# Patient Record
Sex: MALE | Race: OTHER | HISPANIC OR LATINO | URBAN - METROPOLITAN AREA
[De-identification: names, ages, dates, MRNs, and addresses within clinical notes are randomized per-mention and may not be internally consistent; named-entity substitution may affect disease eponyms.]

---

## 2021-10-21 PROBLEM — Z00.00 ENCOUNTER FOR PREVENTIVE HEALTH EXAMINATION: Status: ACTIVE | Noted: 2021-10-21

## 2024-07-30 ENCOUNTER — EMERGENCY (EMERGENCY)
Facility: HOSPITAL | Age: 64
LOS: 0 days | Discharge: HOME | End: 2024-07-30
Attending: STUDENT IN AN ORGANIZED HEALTH CARE EDUCATION/TRAINING PROGRAM
Payer: SELF-PAY

## 2024-07-30 VITALS
HEART RATE: 72 BPM | OXYGEN SATURATION: 99 % | DIASTOLIC BLOOD PRESSURE: 74 MMHG | SYSTOLIC BLOOD PRESSURE: 152 MMHG | RESPIRATION RATE: 18 BRPM

## 2024-07-30 VITALS
HEART RATE: 75 BPM | HEIGHT: 64.96 IN | OXYGEN SATURATION: 96 % | RESPIRATION RATE: 18 BRPM | TEMPERATURE: 99 F | SYSTOLIC BLOOD PRESSURE: 169 MMHG | WEIGHT: 182.98 LBS | DIASTOLIC BLOOD PRESSURE: 85 MMHG

## 2024-07-30 DIAGNOSIS — R35.0 FREQUENCY OF MICTURITION: ICD-10-CM

## 2024-07-30 DIAGNOSIS — N39.0 URINARY TRACT INFECTION, SITE NOT SPECIFIED: ICD-10-CM

## 2024-07-30 DIAGNOSIS — R30.0 DYSURIA: ICD-10-CM

## 2024-07-30 DIAGNOSIS — R33.9 RETENTION OF URINE, UNSPECIFIED: ICD-10-CM

## 2024-07-30 LAB
ALBUMIN SERPL ELPH-MCNC: 4.8 G/DL — SIGNIFICANT CHANGE UP (ref 3.5–5.2)
ALP SERPL-CCNC: 80 U/L — SIGNIFICANT CHANGE UP (ref 30–115)
ALT FLD-CCNC: 13 U/L — SIGNIFICANT CHANGE UP (ref 0–41)
ANION GAP SERPL CALC-SCNC: 13 MMOL/L — SIGNIFICANT CHANGE UP (ref 7–14)
APPEARANCE UR: CLEAR — SIGNIFICANT CHANGE UP
AST SERPL-CCNC: 23 U/L — SIGNIFICANT CHANGE UP (ref 0–41)
BASOPHILS # BLD AUTO: 0.07 K/UL — SIGNIFICANT CHANGE UP (ref 0–0.2)
BASOPHILS NFR BLD AUTO: 0.8 % — SIGNIFICANT CHANGE UP (ref 0–1)
BILIRUB SERPL-MCNC: 0.8 MG/DL — SIGNIFICANT CHANGE UP (ref 0.2–1.2)
BILIRUB UR-MCNC: NEGATIVE — SIGNIFICANT CHANGE UP
BUN SERPL-MCNC: 14 MG/DL — SIGNIFICANT CHANGE UP (ref 10–20)
CALCIUM SERPL-MCNC: 9.7 MG/DL — SIGNIFICANT CHANGE UP (ref 8.4–10.5)
CHLORIDE SERPL-SCNC: 103 MMOL/L — SIGNIFICANT CHANGE UP (ref 98–110)
CO2 SERPL-SCNC: 21 MMOL/L — SIGNIFICANT CHANGE UP (ref 17–32)
COLOR SPEC: SIGNIFICANT CHANGE UP
CREAT SERPL-MCNC: 0.8 MG/DL — SIGNIFICANT CHANGE UP (ref 0.7–1.5)
DIFF PNL FLD: NEGATIVE — SIGNIFICANT CHANGE UP
EGFR: 98 ML/MIN/1.73M2 — SIGNIFICANT CHANGE UP
EOSINOPHIL # BLD AUTO: 0.16 K/UL — SIGNIFICANT CHANGE UP (ref 0–0.7)
EOSINOPHIL NFR BLD AUTO: 1.8 % — SIGNIFICANT CHANGE UP (ref 0–8)
GLUCOSE SERPL-MCNC: 110 MG/DL — HIGH (ref 70–99)
GLUCOSE UR QL: NEGATIVE MG/DL — SIGNIFICANT CHANGE UP
HCT VFR BLD CALC: 39.1 % — LOW (ref 42–52)
HGB BLD-MCNC: 13 G/DL — LOW (ref 14–18)
IMM GRANULOCYTES NFR BLD AUTO: 0.3 % — SIGNIFICANT CHANGE UP (ref 0.1–0.3)
KETONES UR-MCNC: NEGATIVE MG/DL — SIGNIFICANT CHANGE UP
LEUKOCYTE ESTERASE UR-ACNC: ABNORMAL
LYMPHOCYTES # BLD AUTO: 2.01 K/UL — SIGNIFICANT CHANGE UP (ref 1.2–3.4)
LYMPHOCYTES # BLD AUTO: 22.6 % — SIGNIFICANT CHANGE UP (ref 20.5–51.1)
MCHC RBC-ENTMCNC: 31 PG — SIGNIFICANT CHANGE UP (ref 27–31)
MCHC RBC-ENTMCNC: 33.2 G/DL — SIGNIFICANT CHANGE UP (ref 32–37)
MCV RBC AUTO: 93.3 FL — SIGNIFICANT CHANGE UP (ref 80–94)
MONOCYTES # BLD AUTO: 0.49 K/UL — SIGNIFICANT CHANGE UP (ref 0.1–0.6)
MONOCYTES NFR BLD AUTO: 5.5 % — SIGNIFICANT CHANGE UP (ref 1.7–9.3)
NEUTROPHILS # BLD AUTO: 6.12 K/UL — SIGNIFICANT CHANGE UP (ref 1.4–6.5)
NEUTROPHILS NFR BLD AUTO: 69 % — SIGNIFICANT CHANGE UP (ref 42.2–75.2)
NITRITE UR-MCNC: POSITIVE
NRBC # BLD: 0 /100 WBCS — SIGNIFICANT CHANGE UP (ref 0–0)
PH UR: 6 — SIGNIFICANT CHANGE UP (ref 5–8)
PLATELET # BLD AUTO: 332 K/UL — SIGNIFICANT CHANGE UP (ref 130–400)
PMV BLD: 9.6 FL — SIGNIFICANT CHANGE UP (ref 7.4–10.4)
POTASSIUM SERPL-MCNC: 4.5 MMOL/L — SIGNIFICANT CHANGE UP (ref 3.5–5)
POTASSIUM SERPL-SCNC: 4.5 MMOL/L — SIGNIFICANT CHANGE UP (ref 3.5–5)
PROT SERPL-MCNC: 7.7 G/DL — SIGNIFICANT CHANGE UP (ref 6–8)
PROT UR-MCNC: NEGATIVE MG/DL — SIGNIFICANT CHANGE UP
RBC # BLD: 4.19 M/UL — LOW (ref 4.7–6.1)
RBC # FLD: 14.2 % — SIGNIFICANT CHANGE UP (ref 11.5–14.5)
SODIUM SERPL-SCNC: 137 MMOL/L — SIGNIFICANT CHANGE UP (ref 135–146)
SP GR SPEC: 1.01 — SIGNIFICANT CHANGE UP (ref 1–1.03)
UROBILINOGEN FLD QL: 1 MG/DL — SIGNIFICANT CHANGE UP (ref 0.2–1)
WBC # BLD: 8.88 K/UL — SIGNIFICANT CHANGE UP (ref 4.8–10.8)
WBC # FLD AUTO: 8.88 K/UL — SIGNIFICANT CHANGE UP (ref 4.8–10.8)

## 2024-07-30 PROCEDURE — 87086 URINE CULTURE/COLONY COUNT: CPT

## 2024-07-30 PROCEDURE — 99284 EMERGENCY DEPT VISIT MOD MDM: CPT

## 2024-07-30 PROCEDURE — 80053 COMPREHEN METABOLIC PANEL: CPT

## 2024-07-30 PROCEDURE — 36000 PLACE NEEDLE IN VEIN: CPT

## 2024-07-30 PROCEDURE — 99283 EMERGENCY DEPT VISIT LOW MDM: CPT | Mod: 25

## 2024-07-30 PROCEDURE — 85025 COMPLETE CBC W/AUTO DIFF WBC: CPT

## 2024-07-30 PROCEDURE — 81001 URINALYSIS AUTO W/SCOPE: CPT

## 2024-07-30 PROCEDURE — 51702 INSERT TEMP BLADDER CATH: CPT

## 2024-07-30 PROCEDURE — 36415 COLL VENOUS BLD VENIPUNCTURE: CPT

## 2024-07-30 RX ORDER — ACETAMINOPHEN 325 MG
975 TABLET ORAL ONCE
Refills: 0 | Status: COMPLETED | OUTPATIENT
Start: 2024-07-30 | End: 2024-07-30

## 2024-07-30 RX ORDER — NITROFURANTOIN MACROCRYSTAL 100 MG
1 CAPSULE ORAL
Qty: 14 | Refills: 0
Start: 2024-07-30 | End: 2024-08-05

## 2024-07-30 NOTE — ED PROVIDER NOTE - ATTENDING CONTRIBUTION TO CARE
64 year old male with no significant PMH presenting for urinary symptoms. Patient reports dysuria, urinary frequency, and urgency x3 days, associated with suprapubic tenderness.  CONSTITUTIONAL: well developed; in no acute distress  HEAD: normocephalic; atraumatic  EYES: no conjunctival injection, no scleral icterus  ENT: no nasal discharge; airway clear.  NECK: supple; non tender.  CARD: warm and well perfused, not tachycardic  RESP: breathing comfortably on RA, speaking in full sentences w/o distress  Abdomen: Soft, suprapubic Tenderss, None Distended   EXT: moving all extremities spontaneously, normal ROM.  SKIN: warm and dry, no lesions noted  NEURO: alert, oriented, motor and sensory grossly intact, speech nonslurred  PSYCH: calm, cooperative  will do post void US and send ua and reevaluate

## 2024-07-30 NOTE — ED ADULT TRIAGE NOTE - CHIEF COMPLAINT QUOTE
"I feel pain when I urinate, I took pills to help but it made it worse, I have a lot of abd pain." Pt reports he feels like he is unable to urinate completely, c/o dribbling,

## 2024-07-30 NOTE — ED PROVIDER NOTE - CARE PROVIDER_API CALL
Nargis Lombardo  Urology  [address] [place] [id number]  Phone: [phone]  Fax: [phone]  Follow Up Time: 1-3 Days   2 = assistive person

## 2024-07-30 NOTE — ED PROVIDER NOTE - PHYSICAL EXAMINATION
VITAL SIGNS: I have reviewed nursing notes and confirm.  CONSTITUTIONAL: Well-appearing, non-toxic, in NAD  SKIN: Warm dry, normal skin turgor  HEAD: NCAT  EYES: No conjunctival injection, scleral anicteric  ENT: Moist mucous membranes, normal pharynx with no erythema or exudates  NECK: Supple; full ROM. Nontender. No cervical LAD  CARD: RRR, no murmurs, rubs or gallops  RESP: Clear to ausculation bilaterally.  No rales, rhonchi, or wheezing.  ABD: Soft, non-distended, suprapubic-tenderness, no rebound or guarding. No CVA tenderness  EXT: Full ROM, no bony tenderness, no pedal edema, no calf tenderness  NEURO: Normal motor, normal sensory.  Normal gait.  PSYCH: Cooperative, appropriate.

## 2024-07-30 NOTE — ED PROVIDER NOTE - CLINICAL SUMMARY MEDICAL DECISION MAKING FREE TEXT BOX
4 year old male with no significant PMH presenting for urinary symptoms. Patient reports dysuria, urinary frequency, and urgency x3 days, associated with suprapubic tenderness. exam showed suprapubic tenderness. post void US showed 650 cc bladder. placed Matamoros. labs showed uti, given ABX, urology consulted for close follow up they will see pt within 48 hours. explained in Setswana language by dr. Haider who is native Setswana speaker.

## 2024-07-30 NOTE — ED PROVIDER NOTE - NSFOLLOWUPINSTRUCTIONS_ED_ALL_ED_FT
Our Emergency Department Referral Coordinators will be reaching out to you in the next 24-48 hours from 9:00am to 5:00pm with a follow up appointment. Please expect a phone call from the hospital in that time frame. If you do not receive a call or if you have any questions or concerns, you can reach them at   [phone]     Retención urinaria aguda, masculino    La retención urinaria aguda es kristi afección en la que kristi persona no puede orinar o solo puede orinar un poco. Esta condición puede ocurrir repentinamente y durar poco tiempo. Si no se trata, puede volverse crónico y provocar daño renal u otras complicaciones graves.    ¿Cuales son las causas?  Esta condición puede ser causada por:  Obstrucción o estrechamiento del tubo que drena la vejiga (uretra). [place] puede deberse a kristi cirugía, problemas con los órganos cercanos o kristi lesión en la vejiga o la uretra.  Problemas con los nervios de la vejiga.  Tumores en el área de la pelvis, la vejiga o la uretra.  Ciertos medicamentos.  Infección de vejiga o del tracto urinario.  Constipación.  ¿Qué aumenta el riesgo?  Es más probable que esta afección se desarrolle en hombres mayores. A medida que los hombres envejecen, brennan próstata puede agrandarse y comenzar a presionar o apretar la vejiga o la uretra. Otras condiciones de [place]ford crónicas pueden aumentar el riesgo de retención urinaria aguda. Éstas incluyen:  Enfermedades nilda la esclerosis múltiple.  Lesiones de la médula ford.  Diabetes.  Condiciones cognitivas degenerativas, nilda delirio o demencia.  Condiciones psicológicas. Un hombre puede contener la orina debido a un traumatismo o porque no quiere ir al baño.  ¿Cuáles son los signos o síntomas?  Los síntomas de esta condición incluyen:  Problemas para orinar.  Dolor en la parte inferior del abdomen

## 2024-07-30 NOTE — ED PROVIDER NOTE - OBJECTIVE STATEMENT
Patient is a 64 year old male with no significant PMH presenting for urinary symptoms. Patient reports dysuria, urinary frequency, and urgency x3 days, associated with suprapubic tenderness. Patient denies fevers, chest pain, shortness of breath, nausea, vomiting, flank tenderness, diarrhea, constipation, or additional concerns.

## 2024-07-30 NOTE — ED ADULT NURSE REASSESSMENT NOTE - NS ED NURSE REASSESS COMMENT FT1
Pt d/c'd as per MD. Pt to go home w/ Matamoros Catheter w/ leg bad, education given as per MD Weinstein.

## 2024-07-30 NOTE — ED ADULT NURSE NOTE - NSFALLUNIVINTERV_ED_ALL_ED
Bed/Stretcher in lowest position, wheels locked, appropriate side rails in place/Call bell, personal items and telephone in reach/Instruct patient to call for assistance before getting out of bed/chair/stretcher/Non-slip footwear applied when patient is off stretcher/[place] to call system/Physically safe environment - no spills, clutter or unnecessary equipment/Purposeful proactive rounding/Room/bathroom lighting operational, light cord in reach

## 2024-07-30 NOTE — ED ADULT NURSE NOTE - OBJECTIVE STATEMENT
64 yr old male, presenting to ED c/o pelvic pain. Pt c/o urinary symptoms, reports pain on urination and difficulty urinating. Denies n/v/d/fevers/chills. pt A&Ox4, ambulatory baseline.    Interview done w/ MD Js LUNDBERG

## 2024-07-30 NOTE — ED PROVIDER NOTE - PROGRESS NOTE DETAILS
SO: Urology notified and will place patient on rapid follow-up for this week. Matamoros catheter will be placed due to urinary retention. Antibiotics sent to pharmacy.

## 2024-07-31 LAB
CULTURE RESULTS: SIGNIFICANT CHANGE UP
SPECIMEN SOURCE: SIGNIFICANT CHANGE UP

## 2024-08-01 ENCOUNTER — EMERGENCY (EMERGENCY)
Facility: HOSPITAL | Age: 64
LOS: 0 days | Discharge: HOME | End: 2024-08-02
Attending: EMERGENCY MEDICINE
Payer: SELF-PAY

## 2024-08-01 VITALS
RESPIRATION RATE: 17 BRPM | SYSTOLIC BLOOD PRESSURE: 152 MMHG | WEIGHT: 169.98 LBS | HEIGHT: 64.96 IN | HEART RATE: 97 BPM | OXYGEN SATURATION: 99 % | DIASTOLIC BLOOD PRESSURE: 85 MMHG | TEMPERATURE: 99 F

## 2024-08-01 DIAGNOSIS — R33.9 RETENTION OF URINE, UNSPECIFIED: ICD-10-CM

## 2024-08-01 PROCEDURE — 99283 EMERGENCY DEPT VISIT LOW MDM: CPT

## 2024-08-01 RX ORDER — TAMSULOSIN HYDROCHLORIDE 0.4 MG/1
1 CAPSULE ORAL
Qty: 7 | Refills: 0
Start: 2024-08-01 | End: 2024-08-07

## 2024-08-01 RX ORDER — TAMSULOSIN HYDROCHLORIDE 0.4 MG/1
0.4 CAPSULE ORAL ONCE
Refills: 0 | Status: DISCONTINUED | OUTPATIENT
Start: 2024-08-01 | End: 2024-08-02

## 2024-08-01 NOTE — ED PROVIDER NOTE - PHYSICAL EXAMINATION
VITAL SIGNS: I have reviewed nursing notes and confirm.  CONSTITUTIONAL: Well-appearing, non-toxic, in NAD  SKIN: Warm dry, normal skin turgor  HEAD: NCAT  EYES: No conjunctival injection, scleral anicteric  ENT: Moist mucous membranes, normal pharynx with no erythema or exudates  NECK: Supple; full ROM. Nontender. No cervical LAD  CARD: RRR, no murmurs, rubs or gallops  RESP: Clear to ausculation bilaterally.  No rales, rhonchi, or wheezing.  ABD: Soft, non-distended, non-tender, no rebound or guarding. No CVA tenderness  EXT: Full ROM, no bony tenderness, no pedal edema, no calf tenderness  : lucero in place; no erythema or swelling noted   NEURO: Normal motor, normal sensory. Normal gait.  PSYCH: Cooperative, appropriate.

## 2024-08-01 NOTE — ED PROVIDER NOTE - OBJECTIVE STATEMENT
Patient is a 64 year old male with no significant PMH presenting for lucero catheter complaints. Patient was seen in the ER two days prior for dysuria/urinary frequency and was found to be in urinary retention. Lucero catheter placed in the ER and patient was supposed to follow up with Urology but did not receive a call. Patient is returning to the ED to have lucero removed. Additionally patient is complaining of discomfort at the urethral opening. Patient denies fevers, discharge, hematuria, chest pain, shortness of breath, nausea, vomiting, flank pain, or additional complaints.

## 2024-08-01 NOTE — ED PROVIDER NOTE - PROGRESS NOTE DETAILS
SO: Urology notified patient was in the ER. Saw patient bedside and made an appointment for tomorrow morning. Will leave lucero in place and discharge with tamsulosin and motrin

## 2024-08-01 NOTE — ED PROVIDER NOTE - PATIENT PORTAL LINK FT
You can access the FollowMyHealth Patient Portal offered by [facility] by registering at the following website: [email or web address]. By joining [employer]’s FollowMyHealth portal, you will also be able to view your health information using other applications (apps) compatible with our system.

## 2024-08-01 NOTE — ED ADULT TRIAGE NOTE - CHIEF COMPLAINT QUOTE
pt with lucero catheter supposed to be removed yesterday  states has rash around lucero cath insertion

## 2024-08-01 NOTE — ED PROVIDER NOTE - NSFOLLOWUPINSTRUCTIONS_ED_ALL_ED_FT
You have a history of urinary retention, and have an indwelling Matamoros catheter to alleviate your bladder of urine. Please continue to take your medications as prescribed and change your catheter per instructions from your urology team. Please follow up with the urology team in the outpatient setting

## 2024-08-01 NOTE — ED PROVIDER NOTE - ATTENDING CONTRIBUTION TO CARE
I have personally seen and examined this patient.  I have fully participated in the care of this patient. I have reviewed all pertinent clinical information, including history, physical exam, plan and the resident phycisian's note and agree except as noted..

## 2024-08-01 NOTE — ED PROVIDER NOTE - NSFOLLOWUPCLINICS_GEN_ALL_ED_FT
[facility]  Urology  .  [place]   Phone: [phone]  Fax:   Follow Up Time: 1-3 Days

## 2024-08-02 VITALS
WEIGHT: 180.78 LBS | SYSTOLIC BLOOD PRESSURE: 152 MMHG | RESPIRATION RATE: 19 BRPM | HEIGHT: 65 IN | DIASTOLIC BLOOD PRESSURE: 80 MMHG | OXYGEN SATURATION: 96 % | HEART RATE: 78 BPM | TEMPERATURE: 99 F

## 2024-08-02 DIAGNOSIS — Z46.6 ENCOUNTER FOR FITTING AND ADJUSTMENT OF URINARY DEVICE: ICD-10-CM

## 2024-08-02 PROBLEM — Z78.9 OTHER SPECIFIED HEALTH STATUS: Chronic | Status: ACTIVE | Noted: 2024-07-30

## 2024-08-02 PROCEDURE — 99283 EMERGENCY DEPT VISIT LOW MDM: CPT

## 2024-08-02 RX ORDER — TAMSULOSIN HYDROCHLORIDE 0.4 MG/1
0.4 CAPSULE ORAL ONCE
Refills: 0 | Status: COMPLETED | OUTPATIENT
Start: 2024-08-02 | End: 2024-08-02

## 2024-08-02 RX ORDER — TAMSULOSIN HYDROCHLORIDE 0.4 MG/1
1 CAPSULE ORAL
Qty: 14 | Refills: 0
Start: 2024-08-02 | End: 2024-08-15

## 2024-08-02 RX ADMIN — TAMSULOSIN HYDROCHLORIDE 0.4 MILLIGRAM(S): 0.4 CAPSULE ORAL at 16:39

## 2024-08-02 NOTE — ED PROVIDER NOTE - IV ALTEPLASE EXCL REL HIDDEN
Chronic condition.  Patient reported that he used marijuana on occasion.  Strongly advised the patient to discontinue.   show

## 2024-08-02 NOTE — ED ADULT NURSE NOTE - CHIEF COMPLAINT QUOTE
Pt has a lucero catheter x 3 days, pt states he was told it is ok to have it removed by [address] but they made him an appointment for next week.

## 2024-08-02 NOTE — ED ADULT NURSE NOTE - OBJECTIVE STATEMENT
Pt has a lucero catheter x 3 days, pt states he was told it is ok to have it removed by [address] but they made him an appointment for next week.

## 2024-08-02 NOTE — ED ADULT NURSE NOTE - NSFALLUNIVINTERV_ED_ALL_ED
Bed/Stretcher in lowest position, wheels locked, appropriate side rails in place/Call bell, personal items and telephone in reach/Instruct patient to call for assistance before getting out of bed/chair/stretcher/Non-slip footwear applied when patient is off stretcher/[place] to call system/Physically safe environment - no spills, clutter or unnecessary equipment/Purposeful proactive rounding/Room/bathroom lighting operational, light cord in reach

## 2024-08-02 NOTE — ED PROVIDER NOTE - OBJECTIVE STATEMENT
64-year-old male denies any past medical history presents with Matamoros catheter complaint.  States he was given a Matamoros catheter after having urinary retention on 7/30.  States he was told to follow-up today at [address]  States he went to the urologist at [address] and was told to call her to [address] for the urology clinic, where he was told to go to [address] with the emergency department for evaluation of the catheter.  States he wants the catheter removed, as he was told he should attempt a trial of void.  States the catheter has become uncomfortable and he does not want it in place anymore.  Denies fever/chills, abdominal pain, nausea/vomiting, change in bowel/bladder habits, hematuria, dysuria/frequency/urgency, lightheadedness, dizziness, flank pain, rash, penile discharge, scrotal pain. Patient states he has a follow-up appointment with urologist this wednesday.

## 2024-08-02 NOTE — ED ADULT NURSE NOTE - NSFALLUNIVINTERV_ED_ALL_ED
Bed/Stretcher in lowest position, wheels locked, appropriate side rails in place/Call bell, personal items and telephone in reach/Instruct patient to call for assistance before getting out of bed/chair/stretcher/Non-slip footwear applied when patient is off stretcher/[place] to call system/Physically safe environment - no spills, clutter or unnecessary equipment/Purposeful proactive rounding/Room/bathroom lighting operational, light cord in reach

## 2024-08-02 NOTE — ED PROVIDER NOTE - PROGRESS NOTE DETAILS
AY: Patient states he wishes to go home despite having not had urination here.  Explained to patient that without trial of void, there is no certainty he will be able to urinate after being discharged from the hospital.  Patient states he feels he has not had enough water today and wishes to go home and try to drink water and continue to attempt to void.  Explained risks of going home without having attempted urination, and patient states he is aware that if he has any pain to his abdomen or suprapubic/ region that he has to return to the emergency department immediately.  Patient given opportunity to ask questions and endorsed understanding.  Patient endorsed that he understands he has to take Flomax once per day every day in order to aid with resolving potential urinary retention.

## 2024-08-02 NOTE — ED PROVIDER NOTE - NSPTACCESSSVCSAPPTDETAILS_ED_ALL_ED_FT
[place] speaking patient needs follow-up with urologist for urinary retention within 1 week or less

## 2024-08-02 NOTE — ED PROVIDER NOTE - PATIENT PORTAL LINK FT
You can access the FollowMyHealth Patient Portal offered by [facility] by registering at the following website: [email or web address]. By joining [employer]’s FollowMyHealth portal, you will also be able to view your health information using other applications (apps) compatible with our system.

## 2024-08-02 NOTE — ED ADULT TRIAGE NOTE - CHIEF COMPLAINT QUOTE
Pt has a lucero catheter x 3 days, pt states he was told it is ok to have it removed by [address] but they made him an appointment for next week.

## 2024-08-02 NOTE — ED PROVIDER NOTE - NSFOLLOWUPINSTRUCTIONS_ED_ALL_ED_FT
Nuestros coordinadores de referencias del departamento de emergencias se comunicarán con usted en las próximas 24 a  48 horas de 9:00 a. m. a 5:00 p. m. (de lunes a viernes) con kristi mo de seguimiento. Espere kristi llamada telefónica del hospital en tosin período de tiempo. Si no recibe kristi llamada o si tiene alguna pregunta o inquietud, puede comunicarse con rimaos al [phone].      Retención urinaria aguda en los hombres  Acute Urinary Retention, Male    La retención urinaria aguda es kristi afección por la cual la persona no puede orinar o solo puede orinar poco. Esta afección puede ocurrir de repente y durar poco tiempo. Si no se trata, puede tornarse duradera (crónica) y ocasionar daño renal u otras complicaciones graves.    ¿Cuáles son las causas?  Esta afección puede ser causada por lo siguiente:  Obstrucción o estrechamiento del tubo que drena la vejiga (uretra). [place] puede sandy sido provocado por kristi cirugía, problemas en órganos cercanos o lesión en la vejiga o la uretra.  Problemas con los nervios de la vejiga.  Tumores en el área de la pelvis, la vejiga o la uretra.  Ciertos medicamentos.  Infección de vejiga o de las vías urinarias.  Estreñimiento.  ¿Qué incrementa el riesgo?  Es más probable que esta afección se desarrolle en hombres mayores. A medida que los hombres envejecen, la próstata se puede agrandar y comenzar a presionar o apretar la vejiga o la uretra. Otras afecciones crónicas pueden aumentar el riesgo de retención urinaria aguda. Estas incluyen:  Enfermedades, nilda la esclerosis múltiple.  Lesiones en la médula ford.  Diabetes.  Trastornos cognitivos degenerativos, nilda delirios o demencia.  Afecciones psicológicas. Un hombre puede aguantar la orina por un traumatismo o porque no quiere usar el baño.  ¿Cuáles son los signos o síntomas?  Los síntomas de esta afección incluyen:  Dificultad para orinar.  Dolor en la parte baja del abdomen.  ¿Cómo se diagnostica?  Esta afección se diagnostica en función de un examen físico y de los antecedentes médicos. También pueden hacerle otras pruebas, incluidas las siguientes:  Kristi ecografía de la vejiga, del riñón o de ambos.  Análisis de luisito.  Análisis de orina.  Es posible que se necesiten exámenes adicionales, tales nilda exploración por tomografía computarizada (TC) resonancia magnética (RM), y pruebas funcionales del riñón o de la vejiga.  ¿Cómo se trata?  El tratamiento de esta afección puede incluir lo siguiente:  Medicamentos.  Colocar un tubo landrum estéril (catéter) en la vejiga para drenar la orina del cuerpo. [place] recibe el nombre de catéter urinario permanente. Luego de que se inserta, el catéter se mantiene en brennan lugar con un pequeño balón que se llena con agua estéril. La orina se drena desde el catéter hasta la bolsa de recolección fuera del cuerpo.  Terapia conductual.  Tratamiento para otras afecciones.  De ser necesario, puede recibir tratamiento en el hospital para problemas de la función renal o para tratar otras complicaciones.    Siga estas instrucciones en brennan casa:  Medicamentos    Use los medicamentos de venta mayte y los recetados solamente nilda se lo haya indicado el médico. Evite determinados medicamentos, tales nilda descongestivos, antihistamínicos y algunos medicamentos recetados. No tome medicamentos a menos que lo haya autorizado el médico.  Si le recetaron un antibiótico, tómelo nilda se lo haya indicado el médico. No deje de usar el antibiótico aunque comience a sentirse mejor.  Indicaciones generales    No consuma ningún producto que contenga nicotina o tabaco. Estos productos incluyen cigarrillos, tabaco para mascar y aparatos de vapeo, nilda los cigarrillos electrónicos. Si necesita ayuda para dejar de consumir estos productos, consulte al médico.  Fabiola suficiente líquido nilda para mantener la orina de color amarillo pálido.  Si le calvin colocado un catéter urinario permanente, siga las instrucciones del médico.  Controle si hay cambios en los síntomas. Informe al médico si nota algún cambio.  Si así se le indica, controle brennan presión arterial en casa. Informe cualquier cambio en brennan bradford nilda se lo haya indicado el médico.  Cumpla con todas las visitas de seguimiento. [place] es importante.  Comuníquese con un médico si:  Tiene contracciones de vejiga incómodas que no puede controlar (espasmos).  Tiene pérdida de orina con los espasmos.  Solicite ayuda de inmediato si:  Siente escalofríos o tiene fiebre.  Observa luisito en la orina.  Tiene un catéter y ocurre lo siguiente:  El catéter diane de drenar orina.  El catéter se sale del lugar.  Resumen  La retención urinaria aguda es kristi afección por la cual la persona no puede orinar o solo puede orinar poco. Si no se trata, esta afección puede resultar en daño renal u otras complicaciones graves.  Kristi próstata agrandada puede causar esta afección. A medida que los hombres envejecen, la próstata se puede agrandar y comenzar a presionar o apretar la vejiga o la uretra.  El tratamiento de esta afección puede incluir medicamentos y el uso de un catéter urinario permanente.  Controle si hay cambios en los síntomas. Informe al médico si nota algún cambio.

## 2024-08-02 NOTE — ED PROVIDER NOTE - CLINICAL SUMMARY MEDICAL DECISION MAKING FREE TEXT BOX
Agree with above history exam.  Patient here with indwelling Matamoros requesting it to be removed.  Here patient had Matamoros moved did not want to wait for trial of void.  Patient is on Flomax.  Outpatient urology  follow-up advised return if unable to void at home.

## 2024-08-02 NOTE — ED PROVIDER NOTE - PHYSICAL EXAMINATION
Vital Signs: I have reviewed the initial vital signs.  Constitutional: appears stated age, no acute distress  Eyes: Sclera clear, EOMI.  Cardiovascular: S1 and S2, regular rate, regular rhythm, well-perfused extremities, radial pulses equal and 2+, pedal pulses 2+ and equal  Respiratory: unlabored respiratory effort, clear to auscultation bilaterally no wheezing, rales, or rhonchi  Gastrointestinal:  abdomen soft, non-tender, no CVA tenderness, lucero catheter in place without surrounding irritation.  Musculoskeletal: supple neck, no lower extremity edema  Integumentary: warm, dry, no rash  Neurologic: awake, alert, oriented x3, extremities’ motor and sensory functions grossly intact

## 2024-08-07 ENCOUNTER — OUTPATIENT (OUTPATIENT)
Dept: PREADMISSION | Facility: HOSPITAL | Age: 64
LOS: 1 days | Discharge: HOME | End: 2024-08-07
Payer: COMMERCIAL

## 2024-08-07 ENCOUNTER — APPOINTMENT (OUTPATIENT)
Age: 64
End: 2024-08-07

## 2024-08-07 DIAGNOSIS — Z00.00 ENCOUNTER FOR GENERAL ADULT MEDICAL EXAMINATION WITHOUT ABNORMAL FINDINGS: ICD-10-CM

## 2024-08-07 PROBLEM — N40.1 BENIGN PROSTATIC HYPERPLASIA WITH URINARY OBSTRUCTION: Status: ACTIVE | Noted: 2024-08-07

## 2024-08-07 PROBLEM — Z12.5 SCREENING PSA (PROSTATE SPECIFIC ANTIGEN): Status: ACTIVE | Noted: 2024-08-07

## 2024-08-07 PROBLEM — R30.0 DYSURIA: Status: ACTIVE | Noted: 2024-08-07

## 2024-08-07 PROCEDURE — G2211 COMPLEX E/M VISIT ADD ON: CPT

## 2024-08-07 PROCEDURE — 99204 OFFICE O/P NEW MOD 45 MIN: CPT

## 2024-08-07 NOTE — HISTORY OF PRESENT ILLNESS
[FreeTextEntry1] : 64M w hx of BPH/Obstructive LUTS, had a fole catheter placed over  a week ago. He has not tolerated the catheter well and visited the ED multiple times for its removal/TOV.  Patient reports having his Matamoros catheter removed last Friday without any issues.  He has since been voiding without any obstructive issues.  He has been taking tamsulosin daily and ibuprofen for vague lower abdominal pain.  He does complain however of mild dysuria. Denies fever/chills, abdominal pain, nausea/vomiting, change in bowel/bladder habits, hematuria, Dysuria/frequency/urgency, lightheadedness, dizziness, flank pain, rash, penile  Labs 7/2024 UCx normal urogenital tamar UA neg for microhematuria Cr 0.8, GFR 98

## 2024-08-07 NOTE — ASSESSMENT
[FreeTextEntry1] : #BPH/LUTS/PSA screening - obstructive - hx of lucero passed TOV on Friday in ED, no PVR recorded - Bladder US to assess prostate size and PVR - start flomax daily - PSA in 1 month given his recent  tract malipulation with his lucero catheter - UA/UCx - will treat empirically for UTI w Bactrim x1week given pt persistant irritative voiding symptoms Discussed behavioral modifications: 1) Double voiding 2) Avoid caffeine, alcohol, spicy foods, tea, soda, artificial sweeteners, drinking liquids 3-4 hrs before bedtime 3) Perform leg elevation prior to sleeping 4) Avoid constipation by increasing dietary fiber intake with daily supplements to achieve 25 grams of fiber per day.  Increase daily hydration to 64oz of non-caffeinated liquids.  Use stool softeners and/or laxatives as needed if still unable to have daily soft BM without straining.   Also discussed medications adverse effects: 1) Alpha blocker - SE's include light headedness, orthostasis, reduction in volume of ejaculation, floppy iris syndrome   If medical management fails, pt is in chronic urinary retention, recurrent UTIs or urolithiasis, or the patient has BRIDGET caused by obstructive voiding symptoms, he may consider surgery. There are different surgical options including transurethral resection of the prostate, Greenlight laser prostatectomy, Minimally invasive surgical therapies like Urolift and Rezum, Holep and Robotic simple prostatectomy. We discussed the risks including bleeding, infection, damage to the urethra, bladder and ureteral orifices, scar tissue, leaking (either due to injuring the sphincter or lowering bladder outlet obstruction with concomitant detrusor overactivity) and retrograde ejaculation. The patient understands that some of these complications are reversible while some are not and may require additional procedures.

## 2024-08-07 NOTE — PLAN

## 2024-08-07 NOTE — PHYSICAL EXAM
[Abdomen Soft] : soft [Abdomen Tenderness] : non-tender [Costovertebral Angle Tenderness] : no ~M costovertebral angle tenderness [de-identified] : bilaterally

## 2024-08-22 DIAGNOSIS — N40.1 BENIGN PROSTATIC HYPERPLASIA WITH LOWER URINARY TRACT SYMPTOMS: ICD-10-CM

## 2024-08-22 DIAGNOSIS — R30.0 DYSURIA: ICD-10-CM

## 2024-08-22 DIAGNOSIS — Z12.5 ENCOUNTER FOR SCREENING FOR MALIGNANT NEOPLASM OF PROSTATE: ICD-10-CM

## 2024-10-01 ENCOUNTER — NON-APPOINTMENT (OUTPATIENT)
Age: 64
End: 2024-10-01

## 2024-10-02 ENCOUNTER — APPOINTMENT (OUTPATIENT)
Age: 64
End: 2024-10-02

## 2024-11-05 ENCOUNTER — EMERGENCY (EMERGENCY)
Facility: HOSPITAL | Age: 64
LOS: 0 days | Discharge: LEFT BEFORE DISCHARGE | End: 2024-11-05
Payer: SELF-PAY

## 2024-11-05 VITALS
RESPIRATION RATE: 16 BRPM | TEMPERATURE: 99 F | HEART RATE: 70 BPM | OXYGEN SATURATION: 98 % | SYSTOLIC BLOOD PRESSURE: 136 MMHG | DIASTOLIC BLOOD PRESSURE: 83 MMHG

## 2024-11-05 PROCEDURE — L9991: CPT

## 2024-11-06 DIAGNOSIS — Z53.21 PROCEDURE AND TREATMENT NOT CARRIED OUT DUE TO PATIENT LEAVING PRIOR TO BEING SEEN BY HEALTH CARE PROVIDER: ICD-10-CM

## 2024-11-06 DIAGNOSIS — Z76.0 ENCOUNTER FOR ISSUE OF REPEAT PRESCRIPTION: ICD-10-CM

## 2024-11-15 ENCOUNTER — OUTPATIENT (OUTPATIENT)
Dept: OUTPATIENT SERVICES | Facility: HOSPITAL | Age: 64
LOS: 1 days | End: 2024-11-15
Payer: COMMERCIAL

## 2024-11-15 DIAGNOSIS — N40.1 BENIGN PROSTATIC HYPERPLASIA WITH LOWER URINARY TRACT SYMPTOMS: ICD-10-CM

## 2024-11-15 PROCEDURE — 76857 US EXAM PELVIC LIMITED: CPT | Mod: 26

## 2024-11-15 PROCEDURE — 76857 US EXAM PELVIC LIMITED: CPT

## 2024-11-16 DIAGNOSIS — N40.1 BENIGN PROSTATIC HYPERPLASIA WITH LOWER URINARY TRACT SYMPTOMS: ICD-10-CM

## 2024-12-17 ENCOUNTER — EMERGENCY (EMERGENCY)
Facility: HOSPITAL | Age: 64
LOS: 0 days | Discharge: HOME | End: 2024-12-17
Attending: EMERGENCY MEDICINE
Payer: MEDICAID

## 2024-12-17 VITALS
RESPIRATION RATE: 18 BRPM | OXYGEN SATURATION: 99 % | SYSTOLIC BLOOD PRESSURE: 150 MMHG | DIASTOLIC BLOOD PRESSURE: 92 MMHG | HEART RATE: 71 BPM | WEIGHT: 169.32 LBS | TEMPERATURE: 98 F

## 2024-12-17 DIAGNOSIS — N40.0 BENIGN PROSTATIC HYPERPLASIA WITHOUT LOWER URINARY TRACT SYMPTOMS: ICD-10-CM

## 2024-12-17 DIAGNOSIS — H92.01 OTALGIA, RIGHT EAR: ICD-10-CM

## 2024-12-17 DIAGNOSIS — H60.91 UNSPECIFIED OTITIS EXTERNA, RIGHT EAR: ICD-10-CM

## 2024-12-17 PROCEDURE — 99283 EMERGENCY DEPT VISIT LOW MDM: CPT

## 2024-12-17 PROCEDURE — 82962 GLUCOSE BLOOD TEST: CPT

## 2024-12-17 PROCEDURE — 99284 EMERGENCY DEPT VISIT MOD MDM: CPT

## 2024-12-17 RX ADMIN — Medication 4 DROP(S): at 12:16

## 2024-12-17 NOTE — ED PROVIDER NOTE - OBJECTIVE STATEMENT
Case of a 65y/o M, Mongolian speaking, PMHx BPH, presenting with R ear pain for past 9 days. Pt reported using OTC terbinafine ointment and homeopathic ear drops which have not helped. Denies other facial pain, or constitutional symptoms.  Interpretor ID#:[id number]

## 2024-12-17 NOTE — ED PROVIDER NOTE - PHYSICAL EXAMINATION
VITALS:   T(C): 36.9 (12-17-24 @ 10:47), Max: 36.9 (12-17-24 @ 10:47)  HR: 71 (12-17-24 @ 10:47) (71 - 71)  BP: 150/92 (12-17-24 @ 10:47) (150/92 - 150/92)  RR: 18 (12-17-24 @ 10:47) (18 - 18)  SpO2: 99% (12-17-24 @ 10:47) (99% - 99%)    GENERAL: NAD, lying in bed comfortably  HENT: Right ear - erythematous external ear and EAC with pus. Left ear - no signs of inflammation, tympanic membrane not visualized d/t ear wax obstructing view.  EXTREMITIES: No clubbing, cyanosis, or edema  NERVOUS SYSTEM:  A&Ox3  SKIN: No rashes or lesions

## 2024-12-17 NOTE — ED PROVIDER NOTE - NSFOLLOWUPINSTRUCTIONS_ED_ALL_ED_FT
Otitis externa      LO QUE NECESITA SABER:  La otitis externa, u oído de nadador, es kristi infección en el conducto auditivo externo. Mica conducto va desde la parte exterior del oído hasta el tímpano. Anatomía del oído    INSTRUCCIONES PARA EL ASIYA:  Regrese al departamento de emergencias si:  Tiene un dolor de oído intenso.  De repente no puede oír nada.  Tiene kristi nueva hinchazón en la krista, detrás de las orejas o en el negro.  De repente no puede  parte de la krista.  De repente siente la krista entumecida.  Comuníquese con brennan proveedor de atención médica si:  Tiene fiebre.  Jose Armando signos y síntomas no mejoran después de 2 andrea de tratamiento.  Jose Armando signos y síntomas desaparecen por un tiempo, vu luego regresan.  Tiene preguntas o inquietudes sobre brennan afección o atención médica.      Medicamentos:  Los MELANIE, nilda el ibuprofeno, ayudan a disminuir la hinchazón, el dolor y la fiebre. Mica medicamento está disponible con o sin receta médica. Los MELANIE pueden causar sangrado estomacal o problemas renales en ciertas personas. Si vivien medicamentos anticoagulantes, siempre pregunte si los MELANIE son seguros para usted. Emma siempre la etiqueta del medicamento y siga las instrucciones. No le dé estos medicamentos a niños menores de 6 meses sin la autorización del médico de brennan hijo.  El acetaminofeno disminuye el dolor y la fiebre. Está disponible sin receta médica. Pregunte cuánto reji y con qué frecuencia. Siga las instrucciones. El acetaminofeno puede causar daño hepático si no se vivien correctamente.  Abeytas gotas para los oídos llamadas "Corticosporin Otic". Coloque 4 gotas en brennan oído cada 4 horas. Continúe usando las gotas hasta que se acabe el frasco. El antibiótico ayuda a tratar kristi infección bacteriana.  Abeytas brennan medicamento según las indicaciones. Comuníquese con brennan médico si leidy que brennan medicamento no le está ayudando o si tiene efectos secundarios. Infórmele si es alérgico a algún medicamento. Lleve kristi lista de los medicamentos, las vitaminas y las hierbas que vivien. Incluya las cantidades, cuándo y por qué los vivien. Lleve la lista o los frascos de pastillas a las visitas de seguimiento. Lleve consigo la lista de medicamentos en giancarlo de kristi emergencia.  Realice un seguimiento con brennan proveedor de atención médica según las indicaciones: Escriba jose armando preguntas para recordar hacerlas lashonda jose armando visitas.  Cómo usar las gotas para los oídos:  Recuéstese de lado con el oído infectado hacia arriba.  Coloque con cuidado la cantidad correcta de gotas en el oído. Si es posible, pida ayuda a otra persona.  Mueva suavemente la parte externa de la oreja hacia adelante y hacia atrás para ayudar a que el medicamento llegue al canal auditivo.  Permanezca acostado en la misma posición (con la oreja hacia arriba) lashonda 3 a 5 minutos.  Cómo prevenir la otitis externa:  No se coloque hisopos de algodón ni objetos extraños en los oídos.  Envuelva un paño limpio y húmedo alrededor de brennan dedo y úselo para limpiar el oído externo y eliminar la cera sobrante.  Use tapones para los oídos cuando nade. Seque completamente los oídos externos después de nadar o bañarse.      © Copyright [employer] 2019 Todas las ilustraciones e imágenes incluidas en [employer] son propiedad protegida por derechos de autor de [employer] o [employer].

## 2024-12-17 NOTE — ED PROVIDER NOTE - PATIENT PORTAL LINK FT
You can access the FollowMyHealth Patient Portal offered by [facility] by registering at the following website: [email or web address]. By joining [employer]’s FollowMyHealth portal, you will also be able to view your health information using other applications (apps) compatible with our system.

## 2024-12-17 NOTE — ED PROVIDER NOTE - CLINICAL SUMMARY MEDICAL DECISION MAKING FREE TEXT BOX
VSS.  No distress.  FSBG normal.  Otitis externa, no need for wick.  Topical antibiotic/steroids.  DC home.  Strict return instructions discussed.

## 2025-01-06 ENCOUNTER — APPOINTMENT (OUTPATIENT)
Age: 65
End: 2025-01-06
Payer: COMMERCIAL

## 2025-01-06 ENCOUNTER — APPOINTMENT (OUTPATIENT)
Dept: UROLOGY | Facility: CLINIC | Age: 65
End: 2025-01-06

## 2025-01-06 ENCOUNTER — OUTPATIENT (OUTPATIENT)
Dept: OUTPATIENT SERVICES | Facility: HOSPITAL | Age: 65
LOS: 1 days | End: 2025-01-06
Payer: COMMERCIAL

## 2025-01-06 VITALS
DIASTOLIC BLOOD PRESSURE: 85 MMHG | WEIGHT: 174 LBS | OXYGEN SATURATION: 95 % | BODY MASS INDEX: 28.99 KG/M2 | SYSTOLIC BLOOD PRESSURE: 140 MMHG | HEART RATE: 63 BPM | HEIGHT: 64.96 IN | TEMPERATURE: 97.9 F

## 2025-01-06 DIAGNOSIS — R39.15 URGENCY OF URINATION: ICD-10-CM

## 2025-01-06 DIAGNOSIS — N40.1 BENIGN PROSTATIC HYPERPLASIA WITH LOWER URINARY TRACT SYMPMS: ICD-10-CM

## 2025-01-06 DIAGNOSIS — N13.8 BENIGN PROSTATIC HYPERPLASIA WITH LOWER URINARY TRACT SYMPMS: ICD-10-CM

## 2025-01-06 DIAGNOSIS — R39.9 UNSPECIFIED SYMPTOMS AND SIGNS INVOLVING THE GENITOURINARY SYSTEM: ICD-10-CM

## 2025-01-06 DIAGNOSIS — Z00.00 ENCOUNTER FOR GENERAL ADULT MEDICAL EXAMINATION WITHOUT ABNORMAL FINDINGS: ICD-10-CM

## 2025-01-06 PROCEDURE — 99214 OFFICE O/P EST MOD 30 MIN: CPT

## 2025-01-06 PROCEDURE — G2211 COMPLEX E/M VISIT ADD ON: CPT

## 2025-01-06 RX ORDER — TAMSULOSIN HYDROCHLORIDE 0.4 MG/1
0.4 CAPSULE ORAL
Qty: 180 | Refills: 1 | Status: ACTIVE | COMMUNITY
Start: 2025-01-06 | End: 1900-01-01

## 2025-01-09 ENCOUNTER — EMERGENCY (EMERGENCY)
Facility: HOSPITAL | Age: 65
LOS: 0 days | Discharge: HOME | End: 2025-01-09
Attending: STUDENT IN AN ORGANIZED HEALTH CARE EDUCATION/TRAINING PROGRAM
Payer: MEDICAID

## 2025-01-09 VITALS
OXYGEN SATURATION: 98 % | HEART RATE: 88 BPM | RESPIRATION RATE: 18 BRPM | WEIGHT: 175.93 LBS | SYSTOLIC BLOOD PRESSURE: 120 MMHG | TEMPERATURE: 98 F | DIASTOLIC BLOOD PRESSURE: 76 MMHG

## 2025-01-09 DIAGNOSIS — K08.89 OTHER SPECIFIED DISORDERS OF TEETH AND SUPPORTING STRUCTURES: ICD-10-CM

## 2025-01-09 DIAGNOSIS — R22.0 LOCALIZED SWELLING, MASS AND LUMP, HEAD: ICD-10-CM

## 2025-01-09 DIAGNOSIS — K02.9 DENTAL CARIES, UNSPECIFIED: ICD-10-CM

## 2025-01-09 DIAGNOSIS — K04.7 PERIAPICAL ABSCESS WITHOUT SINUS: ICD-10-CM

## 2025-01-09 PROCEDURE — D7140: CPT

## 2025-01-09 PROCEDURE — 99284 EMERGENCY DEPT VISIT MOD MDM: CPT

## 2025-01-09 PROCEDURE — D0230: CPT

## 2025-01-09 PROCEDURE — D0330: CPT

## 2025-01-09 PROCEDURE — T1013: CPT

## 2025-01-09 PROCEDURE — 99284 EMERGENCY DEPT VISIT MOD MDM: CPT | Mod: 25

## 2025-01-09 PROCEDURE — D0220: CPT

## 2025-01-09 PROCEDURE — D0140: CPT

## 2025-01-09 RX ORDER — IBUPROFEN 200 MG
1 TABLET ORAL
Qty: 21 | Refills: 0
Start: 2025-01-09 | End: 2025-01-15

## 2025-01-09 RX ORDER — AMOXICILLIN 500 MG
1 CAPSULE ORAL
Qty: 21 | Refills: 0
Start: 2025-01-09 | End: 2025-01-15

## 2025-01-09 NOTE — ED PROVIDER NOTE - PHYSICAL EXAMINATION
Physical Exam    Vital Signs: I have reviewed the initial vital signs.  Constitutional: well-nourished, appears stated age, no acute distress  Dental: + pain, swelling and discharge to right upper gums. +laxity to tooth #10  Neuro: AOx3, No focal deficits noted

## 2025-01-09 NOTE — ED PROVIDER NOTE - CLINICAL SUMMARY MEDICAL DECISION MAKING FREE TEXT BOX
No submandibular or floor or mouth swelling, voice change, odynophagia or drooling, fever, chills, or inhaled tooth fragment.   Exam shows caries, no inflamed alveolus, + abscess on right upper molar  A/P: Likely caries and dental infection. Will transfer to dental for abscess drainage No sign of deep infection, airway compromise, or spread into surrounding structures.

## 2025-01-09 NOTE — ED PROVIDER NOTE - OBJECTIVE STATEMENT
64-year-old male complaining of dental pain.  Patient states that his front tooth has been loose and hurting him for the past 2 years.  Patient states he now has pain over his right  upper gums.  No fevers.

## 2025-01-09 NOTE — CONSULT NOTE ADULT - ASSESSMENT
Patient is a 64y old male resents with a chief complaint of "upper right swelling".    HPI: Patient reports that the swelling started several days ago and the pain has persisted.    PAST MEDICAL & SURGICAL HISTORY:  No pertinent past medical history  No significant past surgical history    Allergies  No Known Allergies    FAMILY HISTORY:  No pertinent family history in first degree relatives    Vital Signs Last 24 Hrs  T(C): 36.6 (09 Jan 2025 11:31), Max: 36.6 (09 Jan 2025 11:31)  T(F): 97.8 (09 Jan 2025 11:31), Max: 97.8 (09 Jan 2025 11:31)  HR: 88 (09 Jan 2025 11:31) (88 - 88)  BP: 120/76 (09 Jan 2025 11:31) (120/76 - 120/76)  BP(mean): --  RR: 18 (09 Jan 2025 11:31) (18 - 18)  SpO2: 98% (09 Jan 2025 11:31) (98% - 98%)    Parameters below as of 09 Jan 2025 11:31  Patient On (Oxygen Delivery Method): room air    EOE:  TMJ ( -) clicks                     ( -) pops                     ( -) crepitus           Mandible PAYAM within normal limits           Facial bones and MOM <<grossly intact>>             ( -) trismus             ( -) lymphadenopathy             ( -) swelling             ( -) asymmetry             ( -) palpation             ( -) dyspnea             ( -) dysphagia             ( -) loss of consciousness    IOE:  <<permanent>> dentition: <<grossly intact, multiple carious teeth>>           tongue/FOM <<No pathology noted>>           labial/buccal mucosa <<No pathology noted>>           ( +)percussion           ( +) palpation           ( -)swelling            ( +) abscess           ( -) sinus tract    *ASSESSMENT: Clinical exam reveals dental abscess on upper right side, patient expresses pain on palpation during clinical exam, purulence noted.     *PLAN: Patient was transported over to the dental clinic at [facility] for radiographic evaluation. Radiographs reveal extensive bone loss noted around tooth #3. Informed patient that tooth #3 requires extraction in order to treat the infection. Patient agrees to go forward with the extraction.     PROCEDURE: EXT #3  Treatment risks and benefits explained as per OS sheet dated 07/13/2000. Consent obtained and side site completed.  Anesthesia: 3 carpules 3% mepivacaine via local infiltration.   Treatment: Tooth #3 was extracted using elevators and forceps. Thoroughly curettaged and irrigated using saline. Review post-op instructions with patient. Patient understood.    RECOMMENDATIONS:  1) Amoxicillin 500mg, Ibuprofen 600mg  2) F/U with outpatient dentist for comprehensive dental care.     Resident Name: Vilma Barboza DDS

## 2025-01-09 NOTE — ED PROVIDER NOTE - NSFOLLOWUPCLINICS_GEN_ALL_ED_FT
[facility]  Dental  [address] [place] [id number]  Phone: [phone]  Fax:   Follow Up Time: 1-3 Days

## 2025-01-09 NOTE — ED PROVIDER NOTE - PATIENT PORTAL LINK FT
You can access the FollowMyHealth Patient Portal offered by [facility] by registering at the following website: [email or web address]. By joining [employer]’s FollowMyHealth portal, you will also be able to view your health information using other applications (apps) compatible with our system.

## 2025-01-10 DIAGNOSIS — N40.1 BENIGN PROSTATIC HYPERPLASIA WITH LOWER URINARY TRACT SYMPTOMS: ICD-10-CM

## 2025-01-10 DIAGNOSIS — R39.15 URGENCY OF URINATION: ICD-10-CM

## 2025-01-10 NOTE — CONSULT NOTE ADULT - ASSESSMENT
Patient is a 64y old male resents with a chief complaint of "upper right swelling".    [employer] Used: ID [id number]    HPI: Patient reports that the swelling started several days ago and the pain has persisted.    PAST MEDICAL & SURGICAL HISTORY:  No pertinent past medical history  No significant past surgical history    Allergies  No Known Allergies    FAMILY HISTORY:  No pertinent family history in first degree relatives    Vital Signs Last 24 Hrs  T(C): 36.6 (09 Jan 2025 11:31), Max: 36.6 (09 Jan 2025 11:31)  T(F): 97.8 (09 Jan 2025 11:31), Max: 97.8 (09 Jan 2025 11:31)  HR: 88 (09 Jan 2025 11:31) (88 - 88)  BP: 120/76 (09 Jan 2025 11:31) (120/76 - 120/76)  BP(mean): --  RR: 18 (09 Jan 2025 11:31) (18 - 18)  SpO2: 98% (09 Jan 2025 11:31) (98% - 98%)    Parameters below as of 09 Jan 2025 11:31  Patient On (Oxygen Delivery Method): room air    EOE:  TMJ ( -) clicks                     ( -) pops                     ( -) crepitus           Mandible PAYAM within normal limits           Facial bones and MOM <<grossly intact>>             ( -) trismus             ( -) lymphadenopathy             ( -) swelling             ( -) asymmetry             ( -) palpation             ( -) dyspnea             ( -) dysphagia             ( -) loss of consciousness    IOE:  <<permanent>> dentition: <<grossly intact, multiple carious teeth>>           tongue/FOM <<No pathology noted>>           labial/buccal mucosa <<No pathology noted>>           ( +)percussion           ( +) palpation           ( -)swelling            ( +) abscess           ( -) sinus tract    *ASSESSMENT: Clinical exam reveals dental abscess on upper right side, patient expresses pain on palpation during clinical exam, purulence noted.     *PLAN: Patient was transported over to the dental clinic at [facility] for radiographic evaluation. Radiographs reveal extensive bone loss noted around tooth #3. Informed patient that tooth #3 requires extraction in order to treat the infection. Patient agrees to go forward with the extraction.     PROCEDURE: EXT #3  Treatment risks and benefits explained as per OS sheet dated 07/13/2000. Consent obtained and side site completed.  Anesthesia: 3 carpules 3% mepivacaine via local infiltration.   Treatment: Tooth #3 was extracted using elevators and forceps. Thoroughly curettaged and irrigated using saline. Review post-op instructions with patient. Patient understood.    RECOMMENDATIONS:  1) Amoxicillin 500mg, Ibuprofen 600mg  2) F/U with outpatient dentist for comprehensive dental care.     Resident Name: Vilma Barboza DDS

## 2025-02-14 NOTE — ED PROVIDER NOTE - PATIENT PORTAL LINK FT
fall precautions/spinal precautions
You can access the FollowMyHealth Patient Portal offered by [facility] by registering at the following website: [email or web address]. By joining [employer]’s FollowMyHealth portal, you will also be able to view your health information using other applications (apps) compatible with our system.

## 2025-03-05 ENCOUNTER — APPOINTMENT (OUTPATIENT)
Age: 65
End: 2025-03-05
Payer: SELF-PAY

## 2025-03-05 ENCOUNTER — OUTPATIENT (OUTPATIENT)
Dept: OUTPATIENT SERVICES | Facility: HOSPITAL | Age: 65
LOS: 1 days | End: 2025-03-05
Payer: COMMERCIAL

## 2025-03-05 VITALS
OXYGEN SATURATION: 95 % | HEIGHT: 64 IN | WEIGHT: 174 LBS | TEMPERATURE: 98.1 F | SYSTOLIC BLOOD PRESSURE: 134 MMHG | BODY MASS INDEX: 29.71 KG/M2 | HEART RATE: 69 BPM | DIASTOLIC BLOOD PRESSURE: 86 MMHG

## 2025-03-05 DIAGNOSIS — N13.8 BENIGN PROSTATIC HYPERPLASIA WITH LOWER URINARY TRACT SYMPMS: ICD-10-CM

## 2025-03-05 DIAGNOSIS — R39.9 UNSPECIFIED SYMPTOMS AND SIGNS INVOLVING THE GENITOURINARY SYSTEM: ICD-10-CM

## 2025-03-05 DIAGNOSIS — N40.1 BENIGN PROSTATIC HYPERPLASIA WITH LOWER URINARY TRACT SYMPMS: ICD-10-CM

## 2025-03-05 DIAGNOSIS — Z12.5 ENCOUNTER FOR SCREENING FOR MALIGNANT NEOPLASM OF PROSTATE: ICD-10-CM

## 2025-03-05 DIAGNOSIS — Z00.00 ENCOUNTER FOR GENERAL ADULT MEDICAL EXAMINATION WITHOUT ABNORMAL FINDINGS: ICD-10-CM

## 2025-03-05 PROCEDURE — G2211 COMPLEX E/M VISIT ADD ON: CPT

## 2025-03-05 PROCEDURE — 99214 OFFICE O/P EST MOD 30 MIN: CPT

## 2025-03-06 DIAGNOSIS — N40.1 BENIGN PROSTATIC HYPERPLASIA WITH LOWER URINARY TRACT SYMPTOMS: ICD-10-CM

## 2025-03-06 DIAGNOSIS — Z12.5 ENCOUNTER FOR SCREENING FOR MALIGNANT NEOPLASM OF PROSTATE: ICD-10-CM

## 2025-04-02 ENCOUNTER — EMERGENCY (EMERGENCY)
Facility: HOSPITAL | Age: 65
LOS: 0 days | Discharge: HOME | End: 2025-04-02
Attending: EMERGENCY MEDICINE

## 2025-04-02 VITALS
WEIGHT: 182.98 LBS | HEART RATE: 84 BPM | DIASTOLIC BLOOD PRESSURE: 84 MMHG | RESPIRATION RATE: 20 BRPM | SYSTOLIC BLOOD PRESSURE: 146 MMHG | TEMPERATURE: 98 F | OXYGEN SATURATION: 99 %

## 2025-04-02 PROCEDURE — 99282 EMERGENCY DEPT VISIT SF MDM: CPT

## 2025-04-02 PROCEDURE — 99283 EMERGENCY DEPT VISIT LOW MDM: CPT

## 2025-04-02 NOTE — ED PROVIDER NOTE - OBJECTIVE STATEMENT
64-year-old male no past medical history presents to the ED complaining of left upper tooth loose and would like it extracted.  Patient states it has been loose for over a year.  Patient denies any recent trauma, fever, chills or difficulty swallowing.

## 2025-04-02 NOTE — ED PROVIDER NOTE - CLINICAL SUMMARY MEDICAL DECISION MAKING FREE TEXT BOX
Left upper dental pain, loose dentition, requesting exraction.  No distress.  VSS. No acute ED intervention.  DC to f/u with Dental.

## 2025-04-02 NOTE — ED PROVIDER NOTE - PATIENT PORTAL LINK FT
You can access the FollowMyHealth Patient Portal offered by [facility] by registering at the following website: [email or web address]. By joining [employer]’s FollowMyHealth portal, you will also be able to view your health information using other applications (apps) compatible with our system.

## 2025-04-02 NOTE — ED PROVIDER NOTE - NSFOLLOWUPINSTRUCTIONS_ED_ALL_ED_FT
Nuestros coordinadores de referencias del departamento de emergencias se comunicarán con usted en las próximas 24 a  48 horas de 9:00 a. m. a 5:00 p. m. (de lunes a viernes) con kristi mo de seguimiento. Espere kristi llamada telefónica del hospital en tosin período de tiempo. Si no recibe kristi llamada o si tiene alguna pregunta o inquietud, puede comunicarse con omaira ignacio [phone].    You visited the Emergency Department for a dental issue. We recommend that you follow up with a dentist. If you have a private dentist, please contact them directly. If you do not have a dentist, we have a dental clinic that will do its best to accommodate you. To make an appointment, please call the dental clinic at [phone] and leave a voicemail or email them at [email or web address] for a response.

## 2025-04-03 ENCOUNTER — EMERGENCY (EMERGENCY)
Facility: HOSPITAL | Age: 65
LOS: 0 days | Discharge: HOME | End: 2025-04-03
Attending: EMERGENCY MEDICINE
Payer: MEDICAID

## 2025-04-03 VITALS
OXYGEN SATURATION: 98 % | TEMPERATURE: 99 F | SYSTOLIC BLOOD PRESSURE: 165 MMHG | RESPIRATION RATE: 18 BRPM | DIASTOLIC BLOOD PRESSURE: 90 MMHG | HEART RATE: 66 BPM

## 2025-04-03 DIAGNOSIS — K08.89 OTHER SPECIFIED DISORDERS OF TEETH AND SUPPORTING STRUCTURES: ICD-10-CM

## 2025-04-03 PROBLEM — Z78.9 OTHER SPECIFIED HEALTH STATUS: Chronic | Status: INACTIVE | Noted: 2024-07-30 | Resolved: 2025-04-02

## 2025-04-03 PROCEDURE — 99283 EMERGENCY DEPT VISIT LOW MDM: CPT

## 2025-04-03 PROCEDURE — 99282 EMERGENCY DEPT VISIT SF MDM: CPT

## 2025-04-03 NOTE — ED PROVIDER NOTE - PHYSICAL EXAMINATION
GENERAL: Well-developed; well-nourished; in no acute distress.   SKIN: warm, dry, no rashes   ENT: Airway clear. L lateral incisor significantly loose   NECK: Supple; non tender.  NEURO: A/ox3, grossly unremarkable

## 2025-04-03 NOTE — ED PROVIDER NOTE - OBJECTIVE STATEMENT
64-year-old male, with no significant past medical history, presents to the ED with progressively worsening loose left lateral incisor for 1.5 years.  Seen in the ED yesterday and went to dental clinic this morning but was told they do not take his insurance.  Denies pain, fever, chills, dysphagia, nausea, vomiting, trauma.

## 2025-04-03 NOTE — ED PROVIDER NOTE - NSFOLLOWUPINSTRUCTIONS_ED_ALL_ED_FT
Call the number on the back of your insurance card to get a list of dentists that take your insurance. Return for fever, pain, or any other concerns.

## 2025-04-03 NOTE — ED PROVIDER NOTE - PATIENT PORTAL LINK FT
You can access the FollowMyHealth Patient Portal offered by [facility] by registering at the following website: [email or web address]. By joining [employer]’s FollowMyHealth portal, you will also be able to view your health information using other applications (apps) compatible with our system.

## 2025-05-09 ENCOUNTER — OUTPATIENT (OUTPATIENT)
Dept: OUTPATIENT SERVICES | Facility: HOSPITAL | Age: 65
LOS: 1 days | End: 2025-05-09
Payer: MEDICAID

## 2025-05-09 DIAGNOSIS — R39.9 UNSPECIFIED SYMPTOMS AND SIGNS INVOLVING THE GENITOURINARY SYSTEM: ICD-10-CM

## 2025-05-09 PROBLEM — Z78.9 OTHER SPECIFIED HEALTH STATUS: Chronic | Status: ACTIVE | Noted: 2025-04-03

## 2025-05-09 PROCEDURE — 81003 URINALYSIS AUTO W/O SCOPE: CPT

## 2025-05-09 PROCEDURE — 87086 URINE CULTURE/COLONY COUNT: CPT

## 2025-05-10 DIAGNOSIS — R39.9 UNSPECIFIED SYMPTOMS AND SIGNS INVOLVING THE GENITOURINARY SYSTEM: ICD-10-CM

## 2025-05-19 ENCOUNTER — RESULT REVIEW (OUTPATIENT)
Age: 65
End: 2025-05-19

## 2025-05-19 ENCOUNTER — OUTPATIENT (OUTPATIENT)
Dept: OUTPATIENT SERVICES | Facility: HOSPITAL | Age: 65
LOS: 1 days | End: 2025-05-19
Payer: COMMERCIAL

## 2025-05-19 DIAGNOSIS — Z00.8 ENCOUNTER FOR OTHER GENERAL EXAMINATION: ICD-10-CM

## 2025-05-19 PROCEDURE — 76770 US EXAM ABDO BACK WALL COMP: CPT | Mod: 26

## 2025-05-19 PROCEDURE — 76770 US EXAM ABDO BACK WALL COMP: CPT

## 2025-06-11 ENCOUNTER — LABORATORY RESULT (OUTPATIENT)
Age: 65
End: 2025-06-11

## 2025-06-11 ENCOUNTER — OUTPATIENT (OUTPATIENT)
Dept: OUTPATIENT SERVICES | Facility: HOSPITAL | Age: 65
LOS: 1 days | End: 2025-06-11
Payer: COMMERCIAL

## 2025-06-11 ENCOUNTER — APPOINTMENT (OUTPATIENT)
Age: 65
End: 2025-06-11
Payer: COMMERCIAL

## 2025-06-11 VITALS
DIASTOLIC BLOOD PRESSURE: 77 MMHG | HEIGHT: 64 IN | WEIGHT: 174 LBS | SYSTOLIC BLOOD PRESSURE: 125 MMHG | BODY MASS INDEX: 29.71 KG/M2 | HEART RATE: 76 BPM | OXYGEN SATURATION: 94 % | TEMPERATURE: 97.5 F

## 2025-06-11 DIAGNOSIS — Z00.00 ENCOUNTER FOR GENERAL ADULT MEDICAL EXAMINATION WITHOUT ABNORMAL FINDINGS: ICD-10-CM

## 2025-06-11 PROCEDURE — G2211 COMPLEX E/M VISIT ADD ON: CPT

## 2025-06-11 PROCEDURE — 99213 OFFICE O/P EST LOW 20 MIN: CPT

## 2025-06-12 DIAGNOSIS — N40.1 BENIGN PROSTATIC HYPERPLASIA WITH LOWER URINARY TRACT SYMPTOMS: ICD-10-CM

## 2025-06-25 ENCOUNTER — APPOINTMENT (OUTPATIENT)
Age: 65
End: 2025-06-25
Payer: COMMERCIAL

## 2025-06-25 ENCOUNTER — OUTPATIENT (OUTPATIENT)
Dept: OUTPATIENT SERVICES | Facility: HOSPITAL | Age: 65
LOS: 1 days | End: 2025-06-25
Payer: COMMERCIAL

## 2025-06-25 VITALS
TEMPERATURE: 98 F | HEART RATE: 65 BPM | SYSTOLIC BLOOD PRESSURE: 124 MMHG | WEIGHT: 174 LBS | BODY MASS INDEX: 29.87 KG/M2 | OXYGEN SATURATION: 97 % | DIASTOLIC BLOOD PRESSURE: 73 MMHG

## 2025-06-25 DIAGNOSIS — Z00.00 ENCOUNTER FOR GENERAL ADULT MEDICAL EXAMINATION WITHOUT ABNORMAL FINDINGS: ICD-10-CM

## 2025-06-25 PROCEDURE — 99213 OFFICE O/P EST LOW 20 MIN: CPT

## 2025-06-25 PROCEDURE — T1013: CPT

## 2025-06-25 PROCEDURE — 51703 INSERT BLADDER CATH COMPLEX: CPT

## 2025-06-27 DIAGNOSIS — N40.1 BENIGN PROSTATIC HYPERPLASIA WITH LOWER URINARY TRACT SYMPTOMS: ICD-10-CM
